# Patient Record
Sex: FEMALE | Race: WHITE | Employment: FULL TIME | URBAN - METROPOLITAN AREA
[De-identification: names, ages, dates, MRNs, and addresses within clinical notes are randomized per-mention and may not be internally consistent; named-entity substitution may affect disease eponyms.]

---

## 2022-02-24 DIAGNOSIS — M25.531 RIGHT WRIST PAIN: Primary | ICD-10-CM

## 2022-02-25 ENCOUNTER — OFFICE VISIT (OUTPATIENT)
Dept: PHYSICAL MEDICINE AND REHAB | Age: 42
End: 2022-02-25
Payer: COMMERCIAL

## 2022-02-25 VITALS — HEIGHT: 58 IN | WEIGHT: 201 LBS | BODY MASS INDEX: 42.19 KG/M2

## 2022-02-25 DIAGNOSIS — M25.531 RIGHT WRIST PAIN: ICD-10-CM

## 2022-02-25 DIAGNOSIS — G56.01 RIGHT CARPAL TUNNEL SYNDROME: Primary | ICD-10-CM

## 2022-02-25 PROCEDURE — 95911 NRV CNDJ TEST 9-10 STUDIES: CPT | Performed by: PHYSICAL MEDICINE & REHABILITATION

## 2022-02-25 PROCEDURE — 99203 OFFICE O/P NEW LOW 30 MIN: CPT | Performed by: PHYSICAL MEDICINE & REHABILITATION

## 2022-02-25 PROCEDURE — 95886 MUSC TEST DONE W/N TEST COMP: CPT | Performed by: PHYSICAL MEDICINE & REHABILITATION

## 2022-02-25 NOTE — PROGRESS NOTES
[address] [facility]  Electrodiagnostic Laboratory  *Accredited by the [address] [place] [address] with exemplary status  [address] [facility] [place]  Phone: [phone]  Fax: [phone]      Date of Examination: 02/25/22  Patient Name: Pasquale Cornell  is a 42y.o. year old female who was seen today regarding   Chief Complaint   Patient presents with    Extremity Pain     pain in the pointer finger and thumb and into wrist on right side. grabbing items will cause a shooting feeling. 2+ year of symp.  Numbness     numbness/pins and needle feeling in entire hand.  Extremity Weakness     decrease strength in hand. .  The symptoms started after repetitive work injury. The symptoms are intermittent. Previous workup has included: none. No past medical history on file. Past Surgical History:   Procedure Laterality Date    CHOLECYSTECTOMY         There is not family history of neuromuscular conditions. ROS: There has been no associated vision change, hearing change, speech abnormality, swallowing abnormality, or bowel or bladder dysfunction. Physical Exam: General: The patient is in no apparent distress. Height 4' 10\" (1.473 m), weight 201 lb (91.2 kg). MSK: There is no joint effusion, deformity, instability, swelling, erythema or warmth. AROM is full in the spine and extremities. +Tinel right wrist. Neurologic:  No focal sensorimotor deficit. Reflexes 2+ and symmetric. Gait is normal.    Impression:     1. Right carpal tunnel syndrome        Plan:   · EMG is indicated to evaluate the above diagnosis. Orders Placed This Encounter   Procedures    CT NEEDLE EMG EA EXTREMTY W/PARASPINL AREA COMPLETE    CT MOTOR &/SENS 11-12 NRV CNDJ PRECONF ELTRODE LIMB     · EMG was done today and showed median mononeuropathy at the right wrist, clinically consistent with carpal tunnel syndrome. The patient was educated about the diagnosis and the prognosis. · Recommend neutral wrist splints at h.s., OT and/or carpal tunnel injection and if no improvement after 4-6 weeks of conservative treatments consider orthopedic surgery evaluation. Recommend repeating the EMG in 1 year if symptoms persist.    · Advised patient to follow up with referring provider. Thank you for allowing me to participate in the care of your patient.       Sincerely,     Morena Montes, DO

## 2022-02-25 NOTE — PROGRESS NOTES
[address] [facility]  Electrodiagnostic Laboratory  *Accredited by the [address] [place] [address] with exemplary status  [address] [facility] [place]  Phone: [phone]  Fax: [phone]    Referring Provider: JOHN Llanos *  Primary Care Physician: Fabienne Azul DO  Patient Name: Daly Stallings  Patient YOB: 1980  Gender: female  BMI: Body mass index is 42.01 kg/m². Height 4' 10\" (1.473 m), weight 201 lb (91.2 kg). 2/25/2022    Reason for Referral: Wrist pain    Description of clinical problem:   Chief Complaint   Patient presents with    Extremity Pain     pain in the pointer finger and thumb and into wrist on right side. grabbing items will cause a shooting feeling. 2+ year of symp.  Numbness     numbness/pins and needle feeling in entire hand.  Extremity Weakness     decrease strength in hand. Brief physical exam:   Sensory deficit No; Weakness No; Atrophy  No; Reflex abnormality No. +tinel right wrist.   Sensory NCS      Nerve / Sites Rec. Site Peak Lat PP Amp Segments Distance Velocity Temp. ms µV  cm m/s °C   R Median - Digit II (Antidromic)      Palm Dig II 1.77 31.9 Palm - Dig II 7 64 32      Wrist Dig II 3.54 29.2 Wrist - Dig II 14 53 32   R Ulnar - Digit V (Antidromic)      Wrist Dig V 3.54 38.5 Wrist - Dig V 14 53 32   R Radial - Anatomical snuff box (Forearm)      Forearm Wrist 2.03 25.2 Forearm - Wrist 10 64 32       Combined Sensory Index      Nerve / Sites Rec. Site Peak Lat NP Amp PP Amp Segments Dist. Peak Diff Temp.      ms µV µV  cm ms °C   R Median - CSI      Median Thumb 3.28 14.6 67.9 Median - Radial 10 0.57 32      Radial Thumb 2.71 24.4 34.7 Median - Ulnar 14 0.68 32      Median Ring 4.17 17.9 37.0 Median palm - Ulnar palm 8  32      Ulnar Ring 3.49 16.4 21.0          CSI     CSI  1.25*        Motor NCS      Nerve / Sites Muscle Onset Amplitude Segments Distance Velocity Temp.     ms mV  cm m/s °C   R Median - APB      Palm APB 1.67 9.9 Palm - APB   32      Wrist APB 3.70 9.9 Wrist - Palm 8 39* 32      Elbow APB 6.41 10.0 Elbow - Wrist 15 55 32   R Ulnar - ADM      Wrist ADM 3.18 10.5 Wrist - ADM 8  32      B. Elbow ADM 5.31 8.8 B. Elbow - Wrist 15 70 32      A. Elbow ADM 6.93 8.1 A. Elbow - B. Elbow 10 62 32       F  Wave      Nerve Fmin % F    ms %   R Median - APB 25.05 40   R Ulnar - ADM 24.79 70       EMG      EMG Summary Table     Spontaneous MUAP Recruitment   Muscle Nerve Roots IA Fib PSW Fasc Amp Dur. PPP Pattern   R. Biceps brachii Musculocutaneous C5-C6 N None None None N N N N   R. Triceps brachii Radial C6-C8 N None None None N N N N   R. Pronator teres Median C6-C7 N None None None N N N N   R. First dorsal interosseous Ulnar C8-T1 N None None None N N N N   R. Abductor pollicis brevis Median E6-H9 N None None None N N N N          Study Limitations:  none    Summary of Findings:   Nerve conduction studies:   · The following nerve conduction studies were abnormal:   · The right combined sensory index is abnormal.   · The right median motor conduction velocity across the wrist is focally slow. · All other nerve conduction studies, as listed in the table were normal in latency, amplitude and conduction velocity. Needle EMG:   · Needle EMG was performed using a concentric needle. · Observed motor units were normal in amplitude, duration, phases and recruitment and no active denervation signs were seen. Diagnostic Interpretation: This study was abnormal.     Electrodiagnosis: There is electrodiagnostic evidence of a median mononeuropathy. · Location: right at the wrist.   · Nature: [  ] Axonal   [ X ] Demyelinating  [  ] Mixed axonal and demyelinating     [  ] Sensory [  ] Motor               [ X ] Mixed sensorimotor     [  ] with active denervation       [ X ] without active denervation  · Duration: Acute  · Severity: moderate  · Prognosis: Good.  The prognosis for recovery of demyelinating lesions is good if the cause is alleviated. Previous Study: There is not a prior study for comparison. Follow up EMG is recommended if no surgical intervention and symptoms persist in one year. Technologist: Dee Merino  Physician:    Lalita Christopher D.O., P.T. Board Certified Physical Medicine and Rehabilitation  Board Certified Electrodiagnostic Medicine      Nerve conduction studies and electromyography were performed according to our laboratory policies and procedures which can be provided upon request. All abnormal values are identified in the table.  Laboratory normal values can also be provided upon request.       Cc: Kal Isaacs, [address]ard [address], DO